# Patient Record
Sex: MALE | Race: WHITE | NOT HISPANIC OR LATINO | Employment: OTHER | URBAN - NONMETROPOLITAN AREA
[De-identification: names, ages, dates, MRNs, and addresses within clinical notes are randomized per-mention and may not be internally consistent; named-entity substitution may affect disease eponyms.]

---

## 2019-08-05 ENCOUNTER — TRANSCRIBE ORDERS (OUTPATIENT)
Dept: ADMINISTRATIVE | Facility: HOSPITAL | Age: 79
End: 2019-08-05

## 2019-08-05 ENCOUNTER — APPOINTMENT (OUTPATIENT)
Dept: LAB | Facility: HOSPITAL | Age: 79
End: 2019-08-05

## 2019-08-05 DIAGNOSIS — D47.2 MONOCLONAL GAMMOPATHY OF UNDETERMINED SIGNIFICANCE: Primary | ICD-10-CM

## 2019-08-05 PROCEDURE — 82784 ASSAY IGA/IGD/IGG/IGM EACH: CPT | Performed by: INTERNAL MEDICINE

## 2019-08-05 PROCEDURE — 86334 IMMUNOFIX E-PHORESIS SERUM: CPT | Performed by: INTERNAL MEDICINE

## 2019-08-05 PROCEDURE — 84155 ASSAY OF PROTEIN SERUM: CPT | Performed by: INTERNAL MEDICINE

## 2019-08-05 PROCEDURE — 84165 PROTEIN E-PHORESIS SERUM: CPT | Performed by: INTERNAL MEDICINE

## 2019-08-05 PROCEDURE — 36415 COLL VENOUS BLD VENIPUNCTURE: CPT | Performed by: INTERNAL MEDICINE

## 2019-08-06 LAB
ALBUMIN SERPL-MCNC: 4.4 G/DL (ref 2.9–4.4)
ALBUMIN/GLOB SERPL: 1.9 {RATIO} (ref 0.7–1.7)
ALPHA1 GLOB FLD ELPH-MCNC: 0.2 G/DL (ref 0–0.4)
ALPHA2 GLOB SERPL ELPH-MCNC: 0.6 G/DL (ref 0.4–1)
B-GLOBULIN SERPL ELPH-MCNC: 1.1 G/DL (ref 0.7–1.3)
GAMMA GLOB SERPL ELPH-MCNC: 0.6 G/DL (ref 0.4–1.8)
GLOBULIN SER CALC-MCNC: 2.4 G/DL (ref 2.2–3.9)
IGA SERPL-MCNC: 447 MG/DL (ref 61–437)
IGG SERPL-MCNC: 578 MG/DL (ref 700–1600)
IGM SERPL-MCNC: 56 MG/DL (ref 15–143)
INTERPRETATION SERPL IEP-IMP: ABNORMAL
Lab: ABNORMAL
M-SPIKE: 0.4 G/DL
PROT SERPL-MCNC: 6.8 G/DL (ref 6–8.5)

## 2019-11-14 DIAGNOSIS — D50.9 IRON DEFICIENCY ANEMIA, UNSPECIFIED IRON DEFICIENCY ANEMIA TYPE: Primary | ICD-10-CM

## 2019-11-25 ENCOUNTER — OFFICE VISIT (OUTPATIENT)
Dept: ONCOLOGY | Facility: CLINIC | Age: 79
End: 2019-11-25

## 2019-11-25 VITALS
HEART RATE: 64 BPM | HEIGHT: 69 IN | SYSTOLIC BLOOD PRESSURE: 134 MMHG | TEMPERATURE: 97.2 F | RESPIRATION RATE: 16 BRPM | WEIGHT: 168.7 LBS | DIASTOLIC BLOOD PRESSURE: 86 MMHG | BODY MASS INDEX: 24.99 KG/M2 | OXYGEN SATURATION: 94 %

## 2019-11-25 DIAGNOSIS — D47.2 MGUS (MONOCLONAL GAMMOPATHY OF UNKNOWN SIGNIFICANCE): Primary | ICD-10-CM

## 2019-11-25 DIAGNOSIS — D64.9 ANEMIA, UNSPECIFIED TYPE: ICD-10-CM

## 2019-11-25 DIAGNOSIS — E61.1 IRON DEFICIENCY: ICD-10-CM

## 2019-11-25 PROBLEM — F41.1 GAD (GENERALIZED ANXIETY DISORDER): Status: ACTIVE | Noted: 2018-10-25

## 2019-11-25 PROBLEM — H93.13 TINNITUS OF BOTH EARS: Status: ACTIVE | Noted: 2019-10-31

## 2019-11-25 PROBLEM — Z79.899 MEDICATION MANAGEMENT: Status: ACTIVE | Noted: 2017-04-25

## 2019-11-25 PROBLEM — N52.9 ERECTILE DYSFUNCTION: Status: ACTIVE | Noted: 2017-04-25

## 2019-11-25 PROBLEM — E55.9 HYPOVITAMINOSIS D: Status: ACTIVE | Noted: 2019-11-25

## 2019-11-25 PROBLEM — E03.9 ACQUIRED HYPOTHYROIDISM: Status: ACTIVE | Noted: 2019-04-25

## 2019-11-25 PROCEDURE — 99214 OFFICE O/P EST MOD 30 MIN: CPT | Performed by: NURSE PRACTITIONER

## 2019-11-25 RX ORDER — ESOMEPRAZOLE MAGNESIUM 40 MG/1
40 CAPSULE, DELAYED RELEASE ORAL
COMMUNITY

## 2019-11-25 RX ORDER — CALCIUM CARBONATE 200(500)MG
1 TABLET,CHEWABLE ORAL DAILY
COMMUNITY

## 2019-11-25 RX ORDER — DORZOLAMIDE HCL 20 MG/ML
1 SOLUTION/ DROPS OPHTHALMIC 3 TIMES DAILY
COMMUNITY

## 2019-11-25 RX ORDER — LEVOTHYROXINE SODIUM 0.03 MG/1
25 TABLET ORAL DAILY
COMMUNITY

## 2019-11-25 RX ORDER — LOSARTAN POTASSIUM 100 MG/1
100 TABLET ORAL DAILY
COMMUNITY

## 2019-11-25 RX ORDER — TIMOLOL MALEATE 2.5 MG/ML
1 SOLUTION OPHTHALMIC DAILY
COMMUNITY

## 2019-11-25 RX ORDER — MELATONIN
1000 DAILY
COMMUNITY

## 2019-11-25 RX ORDER — MULTIPLE VITAMINS W/ MINERALS TAB 9MG-400MCG
1 TAB ORAL DAILY
COMMUNITY

## 2019-11-25 RX ORDER — ASPIRIN 81 MG/1
81 TABLET ORAL DAILY
COMMUNITY

## 2019-11-25 RX ORDER — VENLAFAXINE 75 MG/1
75 TABLET ORAL 2 TIMES DAILY
COMMUNITY

## 2019-11-25 RX ORDER — DOXAZOSIN MESYLATE 4 MG/1
4 TABLET ORAL NIGHTLY
COMMUNITY

## 2019-11-25 NOTE — PROGRESS NOTES
[facility]  HEMATOLOGY & ONCOLOGY    MGW ONC [facility] HEMATOLOGY AND ONCOLOGY  [address]  [place] [address] [phone]    Patient Name: Markus Washington  Encounter Date: 11/25/2019  YOB: 1940  Patient Number: [id number]    Chief Complaint   Patient presents with   • Macrocytic Anemia     Here for followup   • MGUS   • Leukopenia       REASON FOR VISIT: Mr. Markus Washington is a 79-year-old male who returns in followup of macrocytic anemia, leukopenia and IgA MGUS. He is seen 69.5 months since diagnosis and 63 months since last receipt of IV Venofer. The patient is here with his spouse. History is obtained from the patient who is considered to be a reliable historian.     Mr. Washington presents today feeling well.  He has no complaints.  He denies any fever chills or night sweats.  No pain.  No shortness of breath or chest pain.  Has had no nausea vomiting or any GI upset.  His labs from [facility] show his hemoglobin to be stable at 16.5.  His iron saturation is normal at 48% but ferritin is only 35.  His B12 is at 580.  IgA slightly increased to 483.  M spike reduced to 0.24 in the serum.  Urine studies are yet available.    DIAGNOSTIC ABNORMALITIES:   1. Labs 11/22/2013 Dr. Brown: Hemoglobin 13.1, hematocrit 39.1, MCV 88.1, platelets 182,000, WBC 3.22, 57.8 segs, 27.3 lymphocytes,10.6 monocytes, 3.7 eosinophils (ANC 1.86).  2. Labs, 10/04/2013. Serum ferritin 7, B12 of 783, haptoglobin 62 (34 to 200), folic acid greater than 19.9.  3. Labs, 09/07/2013. Hemoglobin 12.8, hematocrit 38.4, MCV 93, platelets 226,000, WBC 3.80, 56 segs, 28.3 lymphocytes, 10.3 monocytes, 4.9 eosinophils (ANC 2.13). CMP essentially normal with creatinine 0.8 (), calcium 8.6, and total protein 6.8. Liver enzymes normal.  4. Stool guaiac cards x3, 10/14/2013 Dr. Brown. All negative.  5. Labs, 01/14/2014. Hemoglobin 13.0,  hematocrit 38.0, MCV 91.3, platelets 171,000, WBC 5.2, ANC 3.5, GFR 88, alkaline phosphatase 42, . Iron 84, TIBC 408, TSH 2.9, iron sat 20.6, ferritin 8.0, UIBC 324, T4 of 4.6, vitamin B12 of 760, folate greater than 19.9, sed rate 3. HIV nonreactive. Anti-neutrophil Ab - negative.  6. PATHOLOGY: Comprehensive report performed on 07/29/2014. FINAL DIAGNOSIS: Peripheral blood: Unremarkable peripheral blood smear. Flow cytometry - IMPRESSION: FLOW IMPRESSION: Peripheral blood: No increase in myeloid or lymphoid blasts identified. No immunophenotypically abnormal B- or T-cell population identified. Peripheral blood: No increase in myeloid or lymphoid blasts identified. No immunophenotypically abnormal B- or T-cell population identified.  7. SPIEP: 03/21/2016: IgG 541 (791 - 1643), IgA 403.9 (66 - 436), IgM 54 (43 - 279). Serum immunofixation electrophoresis identifies an IgA kappa and free kappa light chain monoclonal immunoglobulin.  8. Labs, 03/30/2016: Hemoglobin 15.1, hematocrit 40.1, .9, platelets 161,000, WBC 5.9 with a normal differential. SPIEP: IgG 541 (791 - 1643), IgA 403 (66 - 430), IgM 54 (43 - 279). Immunofixation electrophoresis shows an IgA kappa and free kappa light chain monoclonal immunoglobulin.   9. Bone marrow biopsy/aspiration, 03/30/3016: Peripheral blood: Unremarkable CBC and peripheral smear. 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 Adequate iron stores. Comprehensive Comments: The patient's reported history of macrocytic anemia is noted. The current hemoglobin, hematocrit and MCV are within normal limits. Erythroids in the bone marrow show just mild megaloblastoid change but otherwise appear unremarkable. There is no diagnostic evidence of dysplasia. Clinical correlation is required. Plasma cells do not appear increased on the aspirate but a mild plasmacytosis is highlighted by a  immunohistochemical study on the biopsy  (approximately 7% plasma cells singly and in small groups) and flow cytometry detects an abnormal plasma cell population, with kappa light chain restriction. 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 Clinical correlation, including correlation with SPEP/UPEP and radiographic studies required. 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 There was no evidence of any other consistent chromosomal aberration to suggest an acquired clonal abnormality. Loss of the Y chromosome is considered to be a normal age-related event in males of increasing age. FISH for common abnormalities associated with plasma cell myeloma is negative.  10. Bone survey done on 05/12/2016 at [facility]. Impression: Examination negative for osteoblastic or osteolytic pathology. Mild to moderate degenerative bony changes. Multiple old healed left-sided rib fractures. Mild S-shaped thoracic and lumbar scoliosis.     PREVIOUS INTERVENTIONS:   1. IV Venofer 200 mg IV daily x5, 01/20/2014 through 01/29/2014 (1000 mg); 08/11/2014 through 08/18/2014 (800 mg).      PAST MEDICAL HISTORY:  ALLERGIES:  Allergies   Allergen Reactions   • Brinzolamide-Brimonidine Swelling     Swollen eyes irritation       CURRENT MEDICATIONS:  Outpatient Encounter Medications as of 11/25/2019   Medication Sig Dispense Refill   • aspirin 81 MG EC tablet Take 81 mg by mouth Daily.     • calcium carbonate (TUMS) 500 MG chewable tablet Chew 1 tablet Daily.     • calcium citrate-vitamin d (CITRACAL) 200-250 MG-UNIT tablet tablet Take  by mouth Daily.     • cholecalciferol (VITAMIN D3) 25 MCG (1000 UT) tablet Take 1,000 Units by mouth Daily.     • dorzolamide (TRUSOPT) 2 % ophthalmic solution 1 drop 3 (Three) Times a Day.     • doxazosin (CARDURA) 4 MG tablet Take 4 mg by mouth Every Night.     • esomeprazole (nexIUM) 40 MG capsule Take 40 mg by mouth Every Morning Before Breakfast.     • lactase (LACTAID) 9000  units tablet tablet Take  by mouth 3 (Three) Times a Day With Meals.     • levothyroxine (SYNTHROID, LEVOTHROID) 25 MCG tablet Take 25 mcg by mouth Daily.     • losartan (COZAAR) 100 MG tablet Take 100 mg by mouth Daily.     • Multiple Vitamins-Minerals (MULTIVITAMIN WITH MINERALS) tablet tablet Take 1 tablet by mouth Daily.     • Omega-3 Fatty Acids (OMEGA 3 PO) Take  by mouth.     • timolol (TIMOPTIC-XR) 0.25 % ophthalmic gel-forming 1 drop Daily.     • venlafaxine (EFFEXOR) 75 MG tablet Take 75 mg by mouth 2 (Two) Times a Day.     • Wheat Dextrin (BENEFIBER DRINK MIX PO) Take  by mouth.       No facility-administered encounter medications on file as of 11/25/2019.      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 No pertinent surgical history.  HEALTH MAINTENANCE ITEMS:  Health Maintenance Due   Topic Date Due   • ANNUAL PHYSICAL  08/20/1944   • TDAP/TD VACCINES (1 - Tdap) 08/20/1960   • ZOSTER VACCINE (1 of 2) 08/20/1991   • PNEUMOCOCCAL VACCINES (65+ LOW/MEDIUM RISK) (1 of 2 - PCV13) 08/20/2006   • INFLUENZA VACCINE  08/01/2019       <no information>  Last Completed Colonoscopy     Patient has no health maintenance due at this time          There is no immunization history on file for this patient.  Last Completed Mammogram     Patient has no health maintenance due at this time            FAMILY HISTORY:  Family History   Problem Relation Age of  "Onset   • No Known Problems Mother    • Prostate cancer Father    • No Known Problems Maternal Grandmother    • No Known Problems Maternal Grandfather    • No Known Problems Paternal Grandmother    • No Known Problems Paternal Grandfather      SOCIAL HISTORY:  Social History     Socioeconomic History   • Marital status:      Spouse name: Not on file   • Number of children: Not on file   • Years of education: Not on file   • Highest education level: Not on file   Tobacco Use   • Smoking status: Former Smoker   • Smokeless tobacco: Never Used   Substance and Sexual Activity   • Alcohol use: Yes     Frequency: Never     Comment: occ.   • Drug use: No   • Sexual activity: Defer       REVIEW OF SYSTEMS:  Review of Systems   Constitutional: Negative for activity change, appetite change, chills, diaphoresis, fatigue, fever and unexpected weight loss.   HENT: Negative for ear pain, nosebleeds, sinus pressure, sore throat and voice change.    Eyes: Negative for blurred vision, double vision, pain and visual disturbance.   Respiratory: Negative for cough and shortness of breath.    Cardiovascular: Negative for chest pain, palpitations and leg swelling.   Gastrointestinal: Negative for abdominal pain, anal bleeding, blood in stool, constipation, diarrhea, nausea and vomiting.   Endocrine: Negative for heat intolerance, polydipsia and polyuria.   Genitourinary: Negative for dysuria, frequency, hematuria, urgency and urinary incontinence.   Musculoskeletal: Negative for arthralgias and myalgias.   Skin: Negative for rash and skin lesions.   Neurological: Negative for dizziness, tremors, seizures, syncope, speech difficulty, weakness and headache.   Hematological: Negative for adenopathy. Does not bruise/bleed easily.   Psychiatric/Behavioral: Negative for dysphoric mood, sleep disturbance, suicidal ideas and depressed mood.       /86   Pulse 64   Temp 97.2 °F (36.2 °C) (Temporal)   Resp 16   Ht 175.3 cm (69\")   " Wt 76.5 kg (168 lb 11.2 oz)   SpO2 94%   BMI 24.91 kg/m²  Body surface area is 1.92 meters squared.  Pain Score    11/25/19 1056   PainSc: 0-No pain       Physical Exam:  General:   He is a pleasant, slender, well-nourished, and well-kept elderly male in no distress. He is ambulatory. He appears to be his stated age. His skin color is normal.  Head/Neck:   The patient is hard of hearing but is anicteric and atraumatic. The trachea is midline. The neck is supple without evidence of jugular venous distention or cervical adenopathy.  Eyes:   The pupils are equal, round, and reactive to light. The extraocular movements are full. There is no scleral jaundice or erythema.  Chest:   The respiratory efforts are normal and unhindered. The chest is clear to auscultation. There are no wheezes, rhonchi, rales, or asymmetry of breath sounds.   Cardiovascular:   The patient has a regular cardiac rate and rhythm without murmurs, rubs, or gallops.  Abdomen:   The belly is soft and slightly globose. There is no rebound, guarding, or tenderness. Bowel sounds are normal.  Extremities:   There is no evidence of cyanosis, clubbing, or edema.  Rheumatologic:   There is evidence of osteoarthritic deformities of the hands. There is no sausaging of the fingers. There is no sign of active synovitis. The gait is normal.  Cutaneous:   There are no overt rashes, disseminated lesions, purpura, or petechiae.  Neurologic:   The patient is alert, oriented, cooperative, and pleasant. He is appropriately conversant. He ambulated into the exam room without assistance and transferred from chair to exam table unaided.   Psych:   Mood and affect are appropriate for circumstance. Eye contact is appropriate.    Markus Washington reports a pain score of 0.      Patient's Body mass index is 24.91 kg/m². BMI is within normal parameters. No follow-up required..      LABS    Lab Results - Last 18 Months   Lab Units 11/16/19  0810 10/28/19  0814   HEMOGLOBIN GM/DL  16.5 15.7   HEMATOCRIT % 47.2 44.8   MCV FL 97.1* 96.6*   WBC THOUS/uL 5.0 4.9   RDW % 11.9 12.2   MPV FL 10.9 10.2   PLATELETS THOUS/uL 194 171   IMM GRAN % % 0.4 0.2   NEUTROS ABS THOUS/uL 3.2 3.3   LYMPHS ABS THOUS/uL 1.0* 0.8*   MONOS ABS THOUS/uL 0.5 0.5   EOS ABS THOUS/uL 0.2 0.2   BASOS ABS THOUS/uL 0.0 0.0   IMMATURE GRANS (ABS) THOUS/uL 0.02 0.01   NRBC /100 WBC'S 0.0 0.0       Lab Results - Last 18 Months   Lab Units 08/05/19  1429   ALBUMIN g/dL 4.4       Lab Results - Last 18 Months   Lab Units 08/05/19  1429   M-SPIKE g/dL 0.4*     ASSESSMENT:   1. Normocytic now macrocytic anemia with prior iron deficiency. Normal counts on 11/16/2019, hemoglobin 16.5; MCV 97.1 (prior range: Hemoglobin 12.8 - 16.2; .8 - 106.2).  a. Esophagogastroduodenoscopy (EGD) and colonoscopy, 2013 (above). Dr. Koo. M2A camera pill evaluation 09/11/2014 per Dr Calloway.   b. Likely due to the monoclonal gammopathy. Normal B12/folate, normal liver function tests, and no evidence for hemolysis. Normal flow cytometry. No dysplasia on bone marrow biopsy.   2. Iron deficiency, recurrent. Repleted serum iron, Fe sat and stable low ferritin since last receipt of Venofer in 2014.   3. Leukopenia, without neutropenia. WBC 5.0; ANC 3.2, 11/16/19 (prior range: WBC 4.2 - 4.9; ANC 3.0 - 3.2).   4. 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 IgA 483; M-spike 0.2 and stable, 11/16/19 (prior: IgA 390 - 415; M-spike none detected - 0.2)   b. Only 7% plasma cells in marrow.   c. No anemia.   d. No hypercalcemia.   e. No renal dysfunction.   f. Negative bone survey, 05/12/2016. No osteoblastic/osteolytic pathology.   g. Bence-Dixon proteinuria. Stable, 298 mg/M-spike - not reported, 03/25/2019; 120 mg/M-spike 19 mg, 11/21/2018 (from 393 mg/M-spike 61 mg, 03/30/2018; from 223 mg/24 hr/M- spike 38, 07/19/2017; from 188 mg/24 protein/ M-spike 41 mg to 21%).  5. Chronic epididymitis.  Quiescent.   6. Shingles 2013 with postherpetic neuralgia right leg.   7. Anxiety.   8. Depression.   9. Reflux.   10. Benign prostatic hypertrophy (BPH). Scheduled for biopsy on 08/07/2019 - Dr. Hatchett.   11. Arthritis.     PLAN:   1.  Re: Apprise of labs from 11/16/2019 with SPIEP showing stable IgA, slightly elevated M-spike, and 11/16/19 with (normal) heme, stable (normal GFR and normal calcium) CMP, normal B12/folate, repleted serum iron with low (less than 100) ferritin, repleted B12/folate, normal kappa/lambda ratio.   2. Obtain report of 24 h UPIEP done at [facility].   3. Dr Ramirez previously discussed monoclonal gammopathy of undetermined significance (MGUS). I noted that MGUS occurs in 1% to 3% of the general population. Four groups of patients with MGUS can be identified: 1) Those still alive with monoclonal gammopathy (19%); 2) Those whose serum immunoglobulin level increased to more than 3g/dL but who do not require any therapy (10%); 3) Patients who die without developing multiple myeloma or other related disease (47%); and 4) Patients who develop myeloma amyloidosis macroglobulinemia or another lymphoproliferative disorder (24%, 2/3 of these disorders from myeloma), which occurred at the median of 8 to 10 years.   4. Previously reviewed medications: Aspirin, Nexium, and Effexor can all be associated with blood dyscrasias. Defer discontinuation of these medications to Dr. Brown.   5. Continue other currently identified medications.   6. Continue ongoing management per primary care physician and other specialists.   7. Advance Directive discussed, questions answered - initially discussed this year on 03/21/2019. Brought in on 11/01/2016.   8. Return to the [place] office with in 16 weeks.   9. Copy of labs to patient.pre-office - send to [facility] - 24 hour UPIEP, SPIEP (specify IgG, IgA, IgM and immunofixation), CMP, serum iron, Fe sat, ferritin, TIBC, B12, folate and  CBC with differential in 16 weeks.    MEDICAL DECISION MAKING: Moderate Complexity   AMOUNT OF DATA: Moderate    Bing Horvath, APRN  11/25/2019  12:31 PM

## 2020-05-25 ENCOUNTER — RESULTS ENCOUNTER (OUTPATIENT)
Dept: ONCOLOGY | Facility: CLINIC | Age: 80
End: 2020-05-25

## 2020-05-25 DIAGNOSIS — D47.2 MGUS (MONOCLONAL GAMMOPATHY OF UNKNOWN SIGNIFICANCE): ICD-10-CM

## 2020-05-25 DIAGNOSIS — E61.1 IRON DEFICIENCY: ICD-10-CM

## 2020-05-25 DIAGNOSIS — D64.9 ANEMIA, UNSPECIFIED TYPE: ICD-10-CM

## 2020-05-29 ENCOUNTER — TELEPHONE (OUTPATIENT)
Dept: ONCOLOGY | Facility: CLINIC | Age: 80
End: 2020-05-29

## 2020-05-29 ENCOUNTER — RESULTS ENCOUNTER (OUTPATIENT)
Dept: ONCOLOGY | Facility: CLINIC | Age: 80
End: 2020-05-29

## 2020-05-29 DIAGNOSIS — D47.2 MONOCLONAL GAMMOPATHY: Primary | ICD-10-CM

## 2020-05-29 DIAGNOSIS — D47.2 MONOCLONAL GAMMOPATHY: ICD-10-CM

## 2020-05-29 DIAGNOSIS — D64.9 ANEMIA, UNSPECIFIED TYPE: ICD-10-CM

## 2020-06-01 ENCOUNTER — TELEPHONE (OUTPATIENT)
Dept: ONCOLOGY | Facility: CLINIC | Age: 80
End: 2020-06-01

## 2020-06-01 NOTE — TELEPHONE ENCOUNTER
Lab orders were faxed with confirmation. No answer from the patient when I called and voicemail was full.

## 2020-06-11 ENCOUNTER — TELEPHONE (OUTPATIENT)
Dept: ONCOLOGY | Facility: CLINIC | Age: 80
End: 2020-06-11

## 2020-06-11 NOTE — TELEPHONE ENCOUNTER
Spoke with wife, Gillian. She request to r/s the patient's appt to next week so that we have all the results back.

## 2020-06-15 NOTE — PROGRESS NOTES
MGW ONC [facility] GROUP HEMATOLOGY AND ONCOLOGY  [address]  [place] [address] [phone]    Patient Name: Markus Washington  Encounter Date: 06/17/2020  YOB: 1940  Patient Number: [id number]    REASON FOR VISIT: Mr. Markus Washington is a 79-year-old male who returns in followup of macrocytic anemia, leukopenia and IgA MGUS. He is seen 75 months since diagnosis and 69 months since last receipt of IV Venofer. The patient is here with his spouse. History is obtained from the patient who is considered to be a reliable historian.     DIAGNOSTIC ABNORMALITIES:   1. Labs 11/22/2013 Dr. Brown: Hemoglobin 13.1, hematocrit 39.1, MCV 88.1, platelets 182,000, WBC 3.22, 57.8 segs, 27.3 lymphocytes,10.6 monocytes, 3.7 eosinophils (ANC 1.86).  2. Labs, 10/04/2013. Serum ferritin 7, B12 of 783, haptoglobin 62 (34 to 200), folic acid greater than 19.9.  3. Labs, 09/07/2013. Hemoglobin 12.8, hematocrit 38.4, MCV 93, platelets 226,000, WBC 3.80, 56 segs, 28.3 lymphocytes, 10.3 monocytes, 4.9 eosinophils (ANC 2.13). CMP essentially normal with creatinine 0.8 (), calcium 8.6, and total protein 6.8. Liver enzymes normal.  4. Stool guaiac cards x3, 10/14/2013 Dr. Brown. All negative.  5. Labs, 01/14/2014. Hemoglobin 13.0, hematocrit 38.0, MCV 91.3, platelets 171,000, WBC 5.2, ANC 3.5, GFR 88, alkaline phosphatase 42, . Iron 84, TIBC 408, TSH 2.9, iron sat 20.6, ferritin 8.0, UIBC 324, T4 of 4.6, vitamin B12 of 760, folate greater than 19.9, sed rate 3. HIV nonreactive. Anti-neutrophil Ab - negative.  6. PATHOLOGY: Comprehensive report performed on 07/29/2014. FINAL DIAGNOSIS: Peripheral blood: Unremarkable peripheral blood smear. Flow cytometry - IMPRESSION: FLOW IMPRESSION: Peripheral blood: No increase in myeloid or lymphoid blasts identified. No immunophenotypically abnormal B- or T-cell population identified. Peripheral blood: No increase in  myeloid or lymphoid blasts identified. No immunophenotypically abnormal B- or T-cell population identified.  7. SPIEP: 03/21/2016: IgG 541 (791 - 1643), IgA 403.9 (66 - 436), IgM 54 (43 - 279). Serum immunofixation electrophoresis identifies an IgA kappa and free kappa light chain monoclonal immunoglobulin.  8. Labs, 03/30/2016: Hemoglobin 15.1, hematocrit 40.1, .9, platelets 161,000, WBC 5.9 with a normal differential. SPIEP: IgG 541 (791 - 1643), IgA 403 (66 - 430), IgM 54 (43 - 279). Immunofixation electrophoresis shows an IgA kappa and free kappa light chain monoclonal immunoglobulin.   9. Bone marrow biopsy/aspiration, 03/30/3016: Peripheral blood: Unremarkable CBC and peripheral smear. 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 Adequate iron stores. Comprehensive Comments: The patient's reported history of macrocytic anemia is noted. The current hemoglobin, hematocrit and MCV are within normal limits. Erythroids in the bone marrow show just mild megaloblastoid change but otherwise appear unremarkable. There is no diagnostic evidence of dysplasia. Clinical correlation is required. Plasma cells do not appear increased on the aspirate but a mild plasmacytosis is highlighted by a  immunohistochemical study on the biopsy (approximately 7% plasma cells singly and in small groups) and flow cytometry detects an abnormal plasma cell population, with kappa light chain restriction. 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 Clinical correlation, including correlation with SPEP/UPEP and radiographic studies required. 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 There was no evidence of any other consistent chromosomal aberration to suggest an acquired clonal abnormality. Loss of the Y chromosome is considered to be a normal age-related event in males of increasing age. FISH for common abnormalities associated  with plasma cell myeloma is negative.  10. Bone survey done on 05/12/2016 at [facility]. Impression: Examination negative for osteoblastic or osteolytic pathology. Mild to moderate degenerative bony changes. Multiple old healed left-sided rib fractures. Mild S-shaped thoracic and lumbar scoliosis.     PREVIOUS INTERVENTIONS:   1. IV Venofer 200 mg IV daily x5, 01/20/2014 through 01/29/2014 (1000 mg); 08/11/2014 through 08/18/2014 (800 mg).        Problem List Items Addressed This Visit        Hematopoietic and Hemostatic    Anemia - Primary         No history exists.       PAST MEDICAL HISTORY:  ALLERGIES:  Allergies   Allergen Reactions   • Brinzolamide-Brimonidine Swelling     Swollen eyes irritation     CURRENT MEDICATIONS:  Outpatient Encounter Medications as of 6/17/2020   Medication Sig Dispense Refill   • aspirin 81 MG EC tablet Take 81 mg by mouth Daily.     • calcium carbonate (TUMS) 500 MG chewable tablet Chew 1 tablet Daily.     • calcium citrate-vitamin d (CITRACAL) 200-250 MG-UNIT tablet tablet Take  by mouth Daily.     • cholecalciferol (VITAMIN D3) 25 MCG (1000 UT) tablet Take 1,000 Units by mouth Daily.     • dorzolamide (TRUSOPT) 2 % ophthalmic solution 1 drop 3 (Three) Times a Day.     • doxazosin (CARDURA) 4 MG tablet Take 4 mg by mouth Every Night.     • esomeprazole (nexIUM) 40 MG capsule Take 40 mg by mouth Every Morning Before Breakfast.     • lactase (LACTAID) 9000 units tablet tablet Take  by mouth 3 (Three) Times a Day With Meals.     • levothyroxine (SYNTHROID, LEVOTHROID) 25 MCG tablet Take 25 mcg by mouth Daily.     • losartan (COZAAR) 100 MG tablet Take 100 mg by mouth Daily.     • Multiple Vitamins-Minerals (MULTIVITAMIN WITH MINERALS) tablet tablet Take 1 tablet by mouth Daily.     • Omega-3 Fatty Acids (OMEGA 3 PO) Take  by mouth.     • tamsulosin (FLOMAX) 0.4 MG capsule 24 hr capsule Take 1 capsule by mouth Daily.     • timolol (TIMOPTIC-XR) 0.25 % ophthalmic  gel-forming 1 drop Daily.     • travoprost, GLORY free, (TRAVATAN) 0.004 % solution ophthalmic solution INSTILL 1 DROP IN BOTH EYES AT BEDTIME.     • venlafaxine (EFFEXOR) 75 MG tablet Take 75 mg by mouth 2 (Two) Times a Day.     • Wheat Dextrin (BENEFIBER DRINK MIX PO) Take  by mouth.       No facility-administered encounter medications on file as of 6/17/2020.      ADULT ILLNESSES:   Iron deficiency anemia ( ICD-10:D50.9 ;Iron deficiency anemia, unspecified   Macrocytic anemia ( ICD-10:D53.9 ;Nutritional anemia, unspecified   Anxiety ( ICD-10:F41.9 ;Anxiety disorder, unspecified   Arthritis ( ICD-10:M19.90 ;Unspecified osteoarthritis, unspecified site   Benign prostatic hypertrophy ( ICD-10:N40.0 ;Enlarged prostate without lower urinary tract symptoms   Chronic epididymitis ( ICD-10:N45.1 ;Epididymitis   Depression ( ICD-10:F32.9 ;Major depressive disorder, single episode, unspecified   Hyperlipidemia ( ICD-10:E78.5 ;Hyperlipidemia, unspecified   Hypertension ( ICD-10:I10 ;Essential (primary) hypertension   Leukopenia ( ICD-10:D72.819 ;Decreased white blood cell count, unspecified   Macrocytosis ( ICD-10:D75.89 ;Other specified diseases of blood and blood-forming organs   Monoclonal gammopathy of uncertain significance ( ICD-10:D47.2 ;Monoclonal gammopathy   Multiple rib fractures ( ICD-10:S22.49xS ;Multiple fractures of ribs, unspecified side, sequela   Postherpetic neuralgia ( right leg; ICD-10:B02.22 ;Postherpetic trigeminal neuralgia )   Reflux ( ICD-10:K21.9 ;Gastroesophageal reflux disease without esophagitis   Shingles ( ICD-10:B02.9 ;Zoster without complications    SURGERIES:   M2A capsule, 09/11/2014, by Dr. Calloway, was clear   Colonoscopy, 10/22/2013, [facility]. Mild diverticulitis. Negative colonoscopy   Endoscopy, 10/22/2013, [facility]. Mild distal gastritis. 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  HEALTH MAINTENANCE ITEMS:  Health Maintenance Due   Topic Date Due   • TDAP/TD VACCINES (1 - Tdap) 08/20/1952   • ZOSTER VACCINE (1 of 2) 08/20/1991   • Pneumococcal Vaccine Once at 65 Years Old  08/20/2006   • HEPATITIS C SCREENING  11/14/2019   • MEDICARE ANNUAL WELLNESS  11/14/2019       <no information>  Last Completed Colonoscopy     Patient has no health maintenance due at this time          There is no immunization history on file for this patient.  Last Completed Mammogram     Patient has no health maintenance due at this time            FAMILY HISTORY:  Family History   Problem Relation Age of Onset   • No Known Problems Mother    • Prostate cancer Father    • No Known Problems Maternal Grandmother    • No Known Problems Maternal Grandfather    • No Known Problems Paternal Grandmother    • No Known Problems Paternal Grandfather      SOCIAL HISTORY:  Social History     Socioeconomic History   • Marital status:      Spouse name: Not on file   • Number of children: Not on file   • Years of education: Not on file   • Highest education level: Not on file   Tobacco Use   • Smoking status: Former Smoker   • Smokeless tobacco: Never Used   Substance and Sexual Activity   • Alcohol use: Yes     Frequency: Never     Comment: occ.   • Drug use: No   • Sexual activity: Defer       REVIEW OF SYSTEMS:  Constitutional:   The  "patient's appetite is good but energy is fair. \"I feel pretty good.\"  He manages most of his ADLs. He still drives, run errands, and manages some of the chores but again admits to not being physically active. He has lost 1 pound (in addition to 2 pounds at his prior visit) in the interval. He has no fevers, chills, or drenching night sweats. His sleep habits seem appropriate.  Ear/Nose/Throat/Mouth:   He is very hard of hearing. He has no sinus symptoms, sore throat, nosebleeds, or sore tongue. He has no headaches. He denies any hoarseness, change in voice quality, or hemoptysis.  Ocular:  He reports no eye pain, significant change in visual acuity, double vision, or blurry vision.  Respiratory:   He has no chronic cough, shortness of breathing, or phlegm production.  Cardiovascular:   He reports no exertional chest pain, chest pressure, or chest heaviness.  Gastrointestinal:   He reports no pica, dysphagia, nausea, vomiting, postprandial abdominal pain, bloating, cramping, change in bowel habits, or discoloration of the stool. He has had no rectal bleeding. He has chronic constipation for which he uses dietary fiber (Benefiber) to good effect but no diarrhea. Has had an EGD (above) and several colonoscopies, most recently summer, 2013, M2A camera pill evaluation (see above).  Genitourinary:   He reports no urinary burning, frequency, dribbling, or discoloration. He has no difficulty controlling his bladder on oxybutynin. He has the need to urinate frequently through the night, at least 3 times nightly. Says he had a prostate biopsy on 08/2019 by Dr. Hatchett.  \"He found a small amount of cancer but not enough to worry about.\"  Is not actively being treated.  Is still following.  Musculoskeletal:   He reports chronic arthralgias of the hands, shoulders, and hips (\"arthritis and bursitis\"). He has no myalgias or nighttime leg cramping.  Extremities:   He reports no trouble with fluid retention or significant leg " "swelling.  Heme/Lymphatic:   He reports no unexplained bleeding, bruising, petechial rashes, or swollen glands.  Skin:   He reports no itching, rashes, or lesions which won't heal.  Neuro:   He reports no loss of consciousness, seizures, fainting spells, or dizziness. He has no weakness of his face, arms, or legs.   Psych:   He seems generally satisfied with life. He has depression and anxiety well-modulated by Effexor.         VITAL SIGNS: /78   Pulse 76   Temp 96.8 °F (36 °C)   Resp 16   Ht 175.3 cm (69\")   Wt 76.1 kg (167 lb 12.8 oz)   SpO2 98%   BMI 24.78 kg/m² Body surface area is 1.92 meters squared.  Pain Score    06/17/20 1047   PainSc: 0-No pain         PHYSICAL EXAMINATION:   General:   He is a pleasant, slender, well-nourished, and well-kept elderly male in no distress. He is ambulatory. He appears to be his stated age. His skin color is normal.  Head/Neck:   The patient is hard of hearing but is anicteric and atraumatic. He is wearing a surgical mask today.  The trachea is midline. The neck is supple without evidence of jugular venous distention or cervical adenopathy.  Eyes:   The pupils are equal, round, and reactive to light. The extraocular movements are full. There is no scleral jaundice or erythema.  Chest:   The respiratory efforts are normal and unhindered. The chest is clear to auscultation. There are no wheezes, rhonchi, rales, or asymmetry of breath sounds.   Cardiovascular:   The patient has a regular cardiac rate and rhythm without murmurs, rubs, or gallops.  Abdomen:   The belly is soft and slightly globose. There is no rebound, guarding, or tenderness. Bowel sounds are normal.  Extremities:   There is no evidence of cyanosis, clubbing, or edema.  Rheumatologic:   There is evidence of osteoarthritic deformities of the hands. There is no sausaging of the fingers. There is no sign of active synovitis. The gait is normal.  Cutaneous:   There are no overt rashes, disseminated " lesions, purpura, or petechiae.  Neurologic:   The patient is alert, oriented, cooperative, and pleasant. He is appropriately conversant. He ambulated into the exam room without assistance and transferred from chair to exam table unaided.   Psych:   Mood and affect are appropriate for circumstance. Eye contact is appropriate.        LABS    Lab Results - Last 18 Months   Lab Units 06/03/20  0842 04/29/20  1520 12/30/19  1816 11/16/19  0810 10/28/19  0814   HEMOGLOBIN GM/DL 15.8 15.2 15.6 16.5 15.7   HEMATOCRIT % 44.0 42.9 43.9 47.2 44.8   MCV FL 95.4* 97.3* 95.2* 97.1* 96.6*   WBC THOUS/uL 5.1 5.9 7.4 5.0 4.9   RDW % 12.0 12.2 12.1 11.9 12.2   MPV FL 10.8 11.4 10.1 10.9 10.2   PLATELETS THOUS/uL 176 177 179 194 171   IMM GRAN % % 0.2 0.3 0.4 0.4 0.2   NEUTROS ABS THOUS/uL 3.4 4.0 6.1* 3.2 3.3   LYMPHS ABS THOUS/uL 1.1 1.2 0.7* 1.0* 0.8*   MONOS ABS THOUS/uL 0.5 0.5 0.6 0.5 0.5   EOS ABS THOUS/uL 0.2 0.1 0.0* 0.2 0.2   BASOS ABS THOUS/uL 0.0 0.0 0.0 0.0 0.0   IMMATURE GRANS (ABS) THOUS/uL 0.01 0.02 0.03 0.02 0.01   NRBC /100 WBC'S 0.0 0.0 0.0 0.0 0.0       Lab Results - Last 18 Months   Lab Units 08/05/19  1429   ALBUMIN g/dL 4.4       Lab Results - Last 18 Months   Lab Units 08/05/19  1429   M-SPIKE g/dL 0.4*       ASSESSMENT:   1. Normocytic now macrocytic anemia with prior iron deficiency. Normal counts on 06/03/2020, hemoglobin 15.8; MCV 95.4 (prior range: Hemoglobin 12.8 - 16.5; .8 - 106.2).  a. Esophagogastroduodenoscopy (EGD) and colonoscopy, 2013 (above). Dr. Koo. M2A camera pill evaluation 09/11/2014 per Dr Calloway.   b. Likely due to the monoclonal gammopathy. Normal B12/folate, normal liver function tests, and no evidence for hemolysis. Normal flow cytometry. No dysplasia on bone marrow biopsy.   2. Iron deficiency, recurrent. Repleted serum iron, Fe sat and stable ferritin since last receipt of Venofer.   3. Leukopenia, without neutropenia. WBC 5.1; ANC 3.4, 06/03/2020 (prior range: WBC 4.2 - 5.0;  ANC 3.0 - 3.2).   4. 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 IgA 441; M-spike DEMETRIS shows faint band in IgA kappa suggestive of specific immune response or early monoclonal protein, 06/03/2020 (prior: IgA 390 - 447; M-spike none detected - 0.2)   b. Only 7% plasma cells in marrow.   c. No anemia.   d. No hypercalcemia.   e. No renal dysfunction.    f. Negative bone survey, 05/12/2016. No osteoblastic/osteolytic pathology.   g. Bence-Dixon proteinuria. Stable, 99 mg 24h/M-spike 33 mg, 06/05/2020; 298 mg/M-spike - not reported; 03/25/2019; 120 mg 24h/M-spike 19 mg, 11/21/2018; 393 mg/M-spike 61 mg, 03/30/2018; 223 mg/24 hr/M- spike 38, 07/19/2017; 188 mg/24 protein/ M-spike 41 mg).  5. Chronic epididymitis. Quiescent.   6. Shingles 2013 with postherpetic neuralgia right leg.   7. Anxiety.   8. Depression.   9. Reflux.   10. Benign prostatic hypertrophy (BPH). Had biopsy on 08/2019 - Dr. Hatchett. Results?    11. Arthritis.     PLAN:   1.  Re: Apprise of labs from 05/30/2020 and 06/03/2020 with stable SPIEP, stable (normal) heme, stable (normal GFR and normal calcium) CMP, normal B12/folate, repleted serum iron, low (48) ferritin, repleted B12/folate, pending kappa/lambda ratio, 24h UPIEP stable.   2. Send to [facility] for SPIEP (specify IgG, IgA, IgM and immunofixation), kappa/lambda light chains, ferritin  3. Obtain report of 24 h UPIEP done at [facility] -second request.   4. Previously discussed monoclonal gammopathy of undetermined significance (MGUS). I noted that MGUS occurs in 1% to 3% of the general population. Four groups of patients with MGUS can be identified: 1) Those still alive with monoclonal gammopathy (19%); 2) Those whose serum immunoglobulin level increased to more than 3g/dL but who do not require any therapy (10%); 3) Patients who die without developing multiple myeloma or other related disease (47%); and 4) Patients who develop  myeloma amyloidosis macroglobulinemia or another lymphoproliferative disorder (24%, 2/3 of these disorders from myeloma), which occurred at the median of 8 to 10 years.   5. Previously reviewed medications: Aspirin, Nexium, and Effexor can all be associated with blood dyscrasias. Defer discontinuation of these medications to pcp (Dr. Hernandez).   6. Continue other currently identified medications.   7. Continue ongoing management per primary care physician and other specialists.   8. Return to the [place] office with in 16 weeks.   9. Copy of labs to patient.pre-office - 24 hour UPIEP, SPIEP (specify IgG, IgA, IgM and immunofixation), k/l light chains, CMP, serum iron, Fe sat, ferritin, TIBC, B12, folate and CBC with differential in 16 weeks.    MEDICAL DECISION MAKING: Moderate Complexity   AMOUNT OF DATA: Moderate    I spent 34 total minutes, face-to-face, caring for Markus today.  Greater than 50% of this time involved counseling and/or coordination of care as documented within this note regarding the patient's illness(es), pros and cons of various treatment options, instructions and/or risk reduction.    cc: Greg Hernandez MD

## 2020-06-17 ENCOUNTER — LAB (OUTPATIENT)
Dept: LAB | Facility: HOSPITAL | Age: 80
End: 2020-06-17

## 2020-06-17 ENCOUNTER — OFFICE VISIT (OUTPATIENT)
Dept: ONCOLOGY | Facility: CLINIC | Age: 80
End: 2020-06-17

## 2020-06-17 VITALS
SYSTOLIC BLOOD PRESSURE: 132 MMHG | BODY MASS INDEX: 24.85 KG/M2 | DIASTOLIC BLOOD PRESSURE: 78 MMHG | TEMPERATURE: 96.8 F | HEART RATE: 76 BPM | OXYGEN SATURATION: 98 % | RESPIRATION RATE: 16 BRPM | WEIGHT: 167.8 LBS | HEIGHT: 69 IN

## 2020-06-17 DIAGNOSIS — D47.2 MGUS (MONOCLONAL GAMMOPATHY OF UNKNOWN SIGNIFICANCE): ICD-10-CM

## 2020-06-17 DIAGNOSIS — D64.9 ANEMIA, UNSPECIFIED TYPE: Primary | ICD-10-CM

## 2020-06-17 LAB — WHOLE BLOOD HOLD SPECIMEN: NORMAL

## 2020-06-17 PROCEDURE — 36415 COLL VENOUS BLD VENIPUNCTURE: CPT

## 2020-06-17 PROCEDURE — 83883 ASSAY NEPHELOMETRY NOT SPEC: CPT

## 2020-06-17 PROCEDURE — 99214 OFFICE O/P EST MOD 30 MIN: CPT | Performed by: INTERNAL MEDICINE

## 2020-06-17 RX ORDER — TAMSULOSIN HYDROCHLORIDE 0.4 MG/1
1 CAPSULE ORAL DAILY
COMMUNITY
Start: 2020-04-29

## 2020-06-17 RX ORDER — TRAVOPROST OPHTHALMIC SOLUTION 0.04 MG/ML
SOLUTION OPHTHALMIC
COMMUNITY
Start: 2020-06-08

## 2020-06-18 LAB
KAPPA LC SERPL-MCNC: 126.5 MG/L (ref 3.3–19.4)
KAPPA LC/LAMBDA SER: 15.06 {RATIO} (ref 0.26–1.65)
LAMBDA LC FREE SERPL-MCNC: 8.4 MG/L (ref 5.7–26.3)

## 2020-09-02 ENCOUNTER — TELEPHONE (OUTPATIENT)
Dept: ONCOLOGY | Facility: CLINIC | Age: 80
End: 2020-09-02

## 2021-01-21 ENCOUNTER — TELEPHONE (OUTPATIENT)
Dept: ONCOLOGY | Facility: CLINIC | Age: 81
End: 2021-01-21